# Patient Record
Sex: MALE | Race: WHITE | Employment: FULL TIME | URBAN - METROPOLITAN AREA
[De-identification: names, ages, dates, MRNs, and addresses within clinical notes are randomized per-mention and may not be internally consistent; named-entity substitution may affect disease eponyms.]

---

## 2023-04-22 ENCOUNTER — APPOINTMENT (OUTPATIENT)
Dept: GENERAL RADIOLOGY | Facility: CLINIC | Age: 38
End: 2023-04-22
Attending: EMERGENCY MEDICINE
Payer: COMMERCIAL

## 2023-04-22 ENCOUNTER — HOSPITAL ENCOUNTER (EMERGENCY)
Facility: CLINIC | Age: 38
Discharge: HOME | End: 2023-04-22
Attending: EMERGENCY MEDICINE | Admitting: EMERGENCY MEDICINE
Payer: COMMERCIAL

## 2023-04-22 ENCOUNTER — APPOINTMENT (OUTPATIENT)
Dept: CT IMAGING | Facility: CLINIC | Age: 38
End: 2023-04-22
Attending: EMERGENCY MEDICINE
Payer: COMMERCIAL

## 2023-04-22 VITALS
TEMPERATURE: 98.2 F | WEIGHT: 235.89 LBS | SYSTOLIC BLOOD PRESSURE: 137 MMHG | OXYGEN SATURATION: 100 % | DIASTOLIC BLOOD PRESSURE: 93 MMHG | HEART RATE: 95 BPM

## 2023-04-22 DIAGNOSIS — S42.301A CLOSED FRACTURE OF RIGHT UPPER EXTREMITY, INITIAL ENCOUNTER: ICD-10-CM

## 2023-04-22 DIAGNOSIS — S43.021A POSTERIOR DISLOCATION OF RIGHT SHOULDER JOINT, INITIAL ENCOUNTER: ICD-10-CM

## 2023-04-22 DIAGNOSIS — R56.9 SEIZURES (H): ICD-10-CM

## 2023-04-22 PROCEDURE — 73200 CT UPPER EXTREMITY W/O DYE: CPT | Mod: RT

## 2023-04-22 PROCEDURE — 99285 EMERGENCY DEPT VISIT HI MDM: CPT | Mod: 25 | Performed by: EMERGENCY MEDICINE

## 2023-04-22 PROCEDURE — 250N000013 HC RX MED GY IP 250 OP 250 PS 637: Performed by: EMERGENCY MEDICINE

## 2023-04-22 PROCEDURE — 73030 X-RAY EXAM OF SHOULDER: CPT | Mod: RT

## 2023-04-22 PROCEDURE — 99285 EMERGENCY DEPT VISIT HI MDM: CPT | Performed by: EMERGENCY MEDICINE

## 2023-04-22 PROCEDURE — 70450 CT HEAD/BRAIN W/O DYE: CPT

## 2023-04-22 RX ORDER — MORPHINE SULFATE 15 MG/1
15 TABLET ORAL EVERY 4 HOURS PRN
Qty: 6 TABLET | Refills: 0 | Status: SHIPPED | OUTPATIENT
Start: 2023-04-22

## 2023-04-22 RX ORDER — MORPHINE SULFATE 15 MG/1
15 TABLET ORAL ONCE
Status: COMPLETED | OUTPATIENT
Start: 2023-04-22 | End: 2023-04-22

## 2023-04-22 RX ORDER — LEVETIRACETAM 750 MG/1
750 TABLET ORAL 2 TIMES DAILY
Qty: 60 TABLET | Refills: 0 | Status: SHIPPED | OUTPATIENT
Start: 2023-04-22 | End: 2023-05-22

## 2023-04-22 RX ORDER — IBUPROFEN 400 MG/1
800 TABLET, FILM COATED ORAL ONCE
Status: COMPLETED | OUTPATIENT
Start: 2023-04-22 | End: 2023-04-22

## 2023-04-22 RX ADMIN — MORPHINE SULFATE 15 MG: 15 TABLET ORAL at 06:02

## 2023-04-22 RX ADMIN — IBUPROFEN 800 MG: 400 TABLET, FILM COATED ORAL at 04:49

## 2023-04-22 RX ADMIN — MORPHINE SULFATE 15 MG: 15 TABLET ORAL at 04:49

## 2023-04-22 RX ADMIN — LEVETIRACETAM 750 MG: 500 TABLET, FILM COATED ORAL at 06:28

## 2023-04-22 ASSESSMENT — ENCOUNTER SYMPTOMS
FATIGUE: 0
NECK PAIN: 0
NECK STIFFNESS: 0
HEADACHES: 1
ABDOMINAL PAIN: 0
CHEST TIGHTNESS: 0
SEIZURES: 1
FEVER: 0
SHORTNESS OF BREATH: 0

## 2023-04-22 ASSESSMENT — ACTIVITIES OF DAILY LIVING (ADL)
ADLS_ACUITY_SCORE: 35
ADLS_ACUITY_SCORE: 35

## 2023-04-22 NOTE — ED NOTES
Emergency Department Patient Sign-out       Brief HPI:  This is a 37 year old male signed out to me by Dr. Toscano.  See initial ED Provider note for details of the presentation.       Significant Events prior to my assuming care: Patient had seizure and complained of right-sided shoulder pain.  X-ray demonstrated fracture and what looked like subluxation, but CT scan was ordered to evaluate for the possibility of a posterior dislocation in addition to fracture.  Neurology was consulted and patient will begin antiepileptic treatment with Keppra.      Exam:   Patient Vitals for the past 24 hrs:   BP Temp Temp src Pulse SpO2 Weight   04/22/23 0445 (!) 137/93 -- -- 95 100 % --   04/22/23 0430 129/87 -- -- 88 100 % --   04/22/23 0415 122/84 -- -- 91 100 % --   04/22/23 0408 -- 98.2  F (36.8  C) Oral -- -- 107 kg (235 lb 14.3 oz)   04/22/23 0401 -- -- -- -- 98 % --   04/22/23 0400 114/75 -- -- 94 98 % --           ED RESULTS:   Results for orders placed or performed during the hospital encounter of 04/22/23 (from the past 24 hour(s))   Head CT w/o contrast     Status: None    Collection Time: 04/22/23  5:12 AM    Narrative    EXAM: CT HEAD W/O CONTRAST  LOCATION: [facility]  DATE/TIME: 4/22/2023 5:12 AM CDT    INDICATION: seizure, history of trauma  COMPARISON: None.  TECHNIQUE: Routine CT Head without IV contrast. Multiplanar reformats. Dose reduction techniques were used.    FINDINGS:  INTRACRANIAL CONTENTS: No intracranial hemorrhage, extraaxial collection, or mass effect.  No CT evidence of acute infarct. Chronic encephalomalacia left orbitofrontal region. Normal ventricles and sulci.     VISUALIZED ORBITS/SINUSES/MASTOIDS: No intraorbital abnormality. No paranasal sinus mucosal disease. No middle ear or mastoid effusion.    BONES/SOFT TISSUES: No acute abnormality.      Impression    IMPRESSION:  1.  No acute intracranial process.  2.  Chronic encephalomalacia left orbitofrontal  region.       Shoulder XR, 2 view, right     Status: None    Collection Time: 04/22/23  5:13 AM    Narrative    EXAM: XR SHOULDER RIGHT 2 VIEWS  LOCATION: [facility]  DATE/TIME: 4/22/2023 5:13 AM CDT    INDICATION: pain, possible dislocation  COMPARISON: None.      Impression    IMPRESSION: Fracture of the proximal humeral metaphysis. There appears to be mild posterior displacement/subluxation of the humeral head fragment. Large defect from the medial aspect of the humeral head, likely remote posttraumatic, and chronic appearing   ossific fragments about the glenohumeral joint.   CT Shoulder Right w/o Contrast     Status: None (Preliminary result)    Collection Time: 04/22/23  6:51 AM    Narrative    EXAM: CT SHOULDER RIGHT WITHOUT CONTRAST  LOCATION: [facility]  DATE/TIME: 04/22/2023, 6:51 AM CDT    INDICATION: Evaluate for dislocation and acute fracture. Abnormal x-ray; Shoulder pain; Fracture, known or rule out, trauma; No shoulder x-ray result available.  COMPARISON: None.  TECHNIQUE: Noncontrast. Axial, sagittal and coronal thin-section reconstruction. Dose reduction techniques were used.     FINDINGS: Complex comminuted and impacted proximal humerus fracture with extension into the humeral head as well as the humeral neck and both tuberosities. There is deformity of the humeral head articular surface with the humeral head posteriorly   subluxed with respect to the glenoid. There is a curvilinear fracture arising from the posterior and posteroinferior glenoid rim likely related to impaction. There are multiple fracture fragments about the fracture site and there is a complex joint   effusion with small loose bodies in the subscapularis recess and axillary pouch.    Visualized portions of the right lung show no masses or consolidations. There is no malalignment or significant arthrosis of the AC joint.      Impression    IMPRESSION:  1.  Complex comminuted  proximal humerus fracture centered in the humeral head with marked architectural distortion and impaction of the humeral head articular surface. There is extension into the tuberosities and neck of the humerus.  2.  Posterior subluxation/dislocation of the humeral head with respect to the glenoid rim. Associated curvilinear glenoid fracture along the posteroinferior rim.  3.  Complex joint effusion with small loose bodies in the subscapularis recess and axillary pouch.    NOTE: ABNORMAL REPORT    THE DICTATION ABOVE DESCRIBES AN ABNORMALITY FOR WHICH FOLLOW-UP IS NEEDED.            ED MEDICATIONS:   Medications   ibuprofen (ADVIL/MOTRIN) tablet 800 mg (800 mg Oral $Given 4/22/23 0449)   morphine (MSIR) IR tablet 15 mg (15 mg Oral $Given 4/22/23 0449)   morphine (MSIR) IR tablet 15 mg (15 mg Oral $Given 4/22/23 0602)   levETIRAcetam (KEPPRA) tablet 750 mg (750 mg Oral $Given 4/22/23 0628)         Impression:    ICD-10-CM    1. Closed fracture of right upper extremity, initial encounter  S42.301A Shoulder Immobilizer, contoured      2. Seizures (H)  R56.9       3. Posterior dislocation of right shoulder joint, initial encounter  S43.021A           Plan:    Pending studies include CT of right shoulder.      After CT scan had been completed, I received a call from orthopedics who had reviewed the CT scan.  They reported that patient did have a posterior dislocation of the humeral head with con commitment fracture of the humeral neck and that he would need specialized care as this was a complex injury.  They recommended transfer to a trauma center for further evaluation and operative management.  This was discussed with the patient who declined transfer and stated that he wanted to fly back to [facility] where he lives and seek care there.  He was cautioned that this was a complex injury and the longer he waits for treatment, the more chance of permanent disability and avascular necrosis of the humeral head.  Patient  reports that he does not have medical coverage for surgery up here and this would cost him too much money whereas his cost would be much less in [place] and stated that he wanted to get a cab from the emergency room to the airport so he could fly back to [place] immediately.  Images were transferred to [facility] and patient was discharged with instructions to seek care immediately upon return to [place].      MD Stacie Donis Gregory Townley, MD  04/22/23 0741       Syed Quinones MD  04/22/23 0747
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
## 2023-04-22 NOTE — DISCHARGE INSTRUCTIONS
Follow-up with an orthopedic surgeon as soon as possible after getting back to [place].  You have a fracture and dislocation of your right shoulder which will need operative repair as soon as possible to avoid permanent disability of right arm.  Take the discs provided which have copies of your images.    Keep arm immobilized in sling.    Keppra as directed.  This is to help prevent seizures.    Morphine, as directed, if needed for more severe pain.      [place] Epilepsy Driving Laws:    When obtaining a license or permit, you are required to report any condition which can even occasionally cause loss of consciousness or voluntary control -- defined as the inability to assume and retain upright posture without support, or inability to respond rationally to external stimuli.    You must be seizure-free for three months in order to obtain a license or permit.     You are required to report a seizure, and the date it occurred, to the Department of Vehicle Services within 30 days of the episode. Your license will then be cancelled until you report that you have been seizure-free for three months with a favorable prognosis from your doctor.    The required reporting form must be completed and signed by both you and your doctor.    After you have been seizure-free for three months, you and your doctor must complete the same form again and submit it to the DVS. Your license will then be reinstated.    Follow-up using the same form is required at six months, one year, and then every four years (or more frequently if your doctor recommends).    Failure to report as required could result in a loss of driving privileges for six months following discovery.     If the seizure is caused by alcohol or controlled substance abuse, the loss of driving privileges following the episode is for one year instead of three months.    Exceptions to license cancellation may be made if the seizure was due to temporary illness, or occurred  under a physician s order to change or withdraw medication, or was the first seizure experienced by the  within four years.    Doctors are not legally required to report a patient s seizures to the state, but they may do so if they believe the person will not report or is continuing to drive.    Until you have been seizure-free for at least 3 months, you should not swim.  You should also not take a bath in a tub without someone being present as you are at increased risk of drowning.